# Patient Record
Sex: MALE | Race: WHITE
[De-identification: names, ages, dates, MRNs, and addresses within clinical notes are randomized per-mention and may not be internally consistent; named-entity substitution may affect disease eponyms.]

---

## 2018-01-10 ENCOUNTER — HOSPITAL ENCOUNTER (OUTPATIENT)
Dept: HOSPITAL 95 - PLD | Age: 78
Discharge: HOME | End: 2018-01-10
Attending: DERMATOLOGY
Payer: MEDICARE

## 2018-01-10 DIAGNOSIS — D22.5: Primary | ICD-10-CM

## 2021-01-12 ENCOUNTER — HOSPITAL ENCOUNTER (OUTPATIENT)
Dept: HOSPITAL 95 - PLD | Age: 81
Discharge: HOME | End: 2021-01-12
Attending: DERMATOLOGY
Payer: MEDICARE

## 2021-01-12 DIAGNOSIS — D48.5: Primary | ICD-10-CM

## 2021-03-31 LAB
ALBUMIN SERPL BCP-MCNC: 3.6 G/DL (ref 3.4–5)
ALBUMIN/GLOB SERPL: 1 {RATIO} (ref 0.8–1.8)
ALT SERPL W P-5'-P-CCNC: 34 U/L (ref 12–78)
ANION GAP SERPL CALCULATED.4IONS-SCNC: 8 MMOL/L (ref 6–16)
AST SERPL W P-5'-P-CCNC: 40 U/L (ref 12–37)
BASOPHILS # BLD: 0 K/MM3 (ref 0–0.23)
BASOPHILS NFR BLD: 0 % (ref 0–2)
BILIRUB SERPL-MCNC: 0.4 MG/DL (ref 0.1–1)
BLASTS NFR BLD MANUAL: 2 % (ref 0–0)
BUN SERPL-MCNC: 31 MG/DL (ref 8–24)
CALCIUM SERPL-MCNC: 8.3 MG/DL (ref 8.5–10.1)
CHLORIDE SERPL-SCNC: 106 MMOL/L (ref 98–108)
CO2 SERPL-SCNC: 26 MMOL/L (ref 21–32)
CREAT SERPL-MCNC: 1.06 MG/DL (ref 0.6–1.2)
DEPRECATED RDW RBC AUTO: 97.5 FL (ref 35.1–46.3)
EOSINOPHIL # BLD: 0 K/MM3 (ref 0–0.68)
EOSINOPHIL NFR BLD: 0 % (ref 0–6)
ERYTHROCYTE [DISTWIDTH] IN BLOOD BY AUTOMATED COUNT: 22.5 % (ref 11.7–14.2)
GLOBULIN SER CALC-MCNC: 3.6 G/DL (ref 2.2–4)
GLUCOSE SERPL-MCNC: 135 MG/DL (ref 70–99)
HCT VFR BLD AUTO: 21.7 % (ref 37–53)
HGB BLD-MCNC: 6.8 G/DL (ref 13.5–17.5)
LYMPHOCYTES # BLD: 3.88 K/MM3 (ref 0.84–5.2)
LYMPHOCYTES NFR BLD: 12 % (ref 21–46)
MCHC RBC AUTO-ENTMCNC: 31.3 G/DL (ref 31.5–36.5)
MCV RBC AUTO: 123 FL (ref 80–100)
METAMYELOCYTES # BLD MANUAL: 0.64 K/MM3 (ref 0–0)
METAMYELOCYTES NFR BLD MANUAL: 2 % (ref 0–0)
MONOCYTES # BLD: 5.17 K/MM3 (ref 0.16–1.47)
MONOCYTES NFR BLD: 16 % (ref 4–13)
MYELOCYTES # BLD MANUAL: 3.88 K/MM3 (ref 0–0)
MYELOCYTES NFR BLD MANUAL: 12 % (ref 0–0)
NEUTS BAND NFR BLD MANUAL: 1 % (ref 0–8)
NEUTS SEG # BLD MANUAL: 18.11 K/MM3 (ref 1.96–9.15)
NEUTS SEG NFR BLD MANUAL: 55 % (ref 41–73)
NRBC # BLD AUTO: 0.29 K/MM3 (ref 0–0.02)
NRBC BLD AUTO-RTO: 0.9 /100 WBC (ref 0–0.2)
PLATELET # BLD AUTO: 181 K/MM3 (ref 150–400)
POTASSIUM SERPL-SCNC: 3.4 MMOL/L (ref 3.5–5.5)
PROT SERPL-MCNC: 7.2 G/DL (ref 6.4–8.2)
SODIUM SERPL-SCNC: 140 MMOL/L (ref 136–145)
TOTAL CELLS COUNTED BLD: 100

## 2021-04-01 ENCOUNTER — HOSPITAL ENCOUNTER (OUTPATIENT)
Dept: HOSPITAL 95 - ATC | Age: 81
Discharge: HOME | End: 2021-04-01
Attending: INTERNAL MEDICINE
Payer: MEDICARE

## 2021-04-01 DIAGNOSIS — D64.9: ICD-10-CM

## 2021-04-01 DIAGNOSIS — D47.3: ICD-10-CM

## 2021-04-01 DIAGNOSIS — C92.A0: Primary | ICD-10-CM

## 2021-04-01 PROCEDURE — P9016 RBC LEUKOCYTES REDUCED: HCPCS

## 2021-04-21 ENCOUNTER — HOSPITAL ENCOUNTER (OUTPATIENT)
Dept: HOSPITAL 95 - ATC | Age: 81
Discharge: HOME | End: 2021-04-21
Attending: INTERNAL MEDICINE
Payer: MEDICARE

## 2021-04-21 DIAGNOSIS — D47.3: ICD-10-CM

## 2021-04-21 DIAGNOSIS — C92.A0: Primary | ICD-10-CM

## 2021-04-21 DIAGNOSIS — D64.9: ICD-10-CM

## 2021-04-21 DIAGNOSIS — I10: ICD-10-CM

## 2021-04-21 DIAGNOSIS — C64.2: ICD-10-CM

## 2021-04-21 DIAGNOSIS — Z87.891: ICD-10-CM

## 2021-04-21 DIAGNOSIS — C78.80: ICD-10-CM

## 2021-04-21 LAB
ALBUMIN SERPL BCP-MCNC: 3.6 G/DL (ref 3.4–5)
ALBUMIN/GLOB SERPL: 1.1 {RATIO} (ref 0.8–1.8)
ALT SERPL W P-5'-P-CCNC: 25 U/L (ref 12–78)
ANION GAP SERPL CALCULATED.4IONS-SCNC: 5 MMOL/L (ref 6–16)
AST SERPL W P-5'-P-CCNC: 9 U/L (ref 12–37)
BASOPHILS # BLD: 0 K/MM3 (ref 0–0.23)
BASOPHILS NFR BLD: 0 % (ref 0–2)
BILIRUB SERPL-MCNC: 0.7 MG/DL (ref 0.1–1)
BUN SERPL-MCNC: 19 MG/DL (ref 8–24)
CALCIUM SERPL-MCNC: 8.3 MG/DL (ref 8.5–10.1)
CHLORIDE SERPL-SCNC: 106 MMOL/L (ref 98–108)
CO2 SERPL-SCNC: 28 MMOL/L (ref 21–32)
CREAT SERPL-MCNC: 0.95 MG/DL (ref 0.6–1.2)
DEPRECATED RDW RBC AUTO: 78.8 FL (ref 35.1–46.3)
EOSINOPHIL # BLD: 0 K/MM3 (ref 0–0.68)
EOSINOPHIL NFR BLD: 0 % (ref 0–6)
ERYTHROCYTE [DISTWIDTH] IN BLOOD BY AUTOMATED COUNT: 21.4 % (ref 11.7–14.2)
GLOBULIN SER CALC-MCNC: 3.3 G/DL (ref 2.2–4)
GLUCOSE SERPL-MCNC: 104 MG/DL (ref 70–99)
HCT VFR BLD AUTO: 18.8 % (ref 37–53)
HGB BLD-MCNC: 6 G/DL (ref 13.5–17.5)
LYMPHOCYTES # BLD: 0.62 K/MM3 (ref 0.84–5.2)
LYMPHOCYTES NFR BLD: 32 % (ref 21–46)
MCHC RBC AUTO-ENTMCNC: 31.9 G/DL (ref 31.5–36.5)
MCV RBC AUTO: 107 FL (ref 80–100)
MONOCYTES # BLD: 0.99 K/MM3 (ref 0.16–1.47)
MONOCYTES NFR BLD: 51 % (ref 4–13)
NEUTS SEG # BLD MANUAL: 0.33 K/MM3 (ref 1.96–9.15)
NEUTS SEG NFR BLD MANUAL: 17 % (ref 41–73)
NRBC # BLD AUTO: 0.02 K/MM3 (ref 0–0.02)
NRBC BLD AUTO-RTO: 1 /100 WBC (ref 0–0.2)
PLATELET # BLD AUTO: 169 K/MM3 (ref 150–400)
POTASSIUM SERPL-SCNC: 3.7 MMOL/L (ref 3.5–5.5)
PROT SERPL-MCNC: 6.9 G/DL (ref 6.4–8.2)
SODIUM SERPL-SCNC: 139 MMOL/L (ref 136–145)
TOTAL CELLS COUNTED BLD: 100

## 2021-04-21 PROCEDURE — P9016 RBC LEUKOCYTES REDUCED: HCPCS

## 2021-05-10 LAB
ALBUMIN SERPL BCP-MCNC: 3.2 G/DL (ref 3.4–5)
ALBUMIN/GLOB SERPL: 1.1 {RATIO} (ref 0.8–1.8)
ALT SERPL W P-5'-P-CCNC: 22 U/L (ref 12–78)
ANION GAP SERPL CALCULATED.4IONS-SCNC: 7 MMOL/L (ref 6–16)
AST SERPL W P-5'-P-CCNC: 9 U/L (ref 12–37)
BASOPHILS # BLD AUTO: 0 K/MM3 (ref 0–0.23)
BASOPHILS # BLD: 0 K/MM3 (ref 0–0.23)
BASOPHILS NFR BLD AUTO: 0 % (ref 0–2)
BASOPHILS NFR BLD: 0 % (ref 0–2)
BILIRUB SERPL-MCNC: 0.4 MG/DL (ref 0.1–1)
BUN SERPL-MCNC: 16 MG/DL (ref 8–24)
CALCIUM SERPL-MCNC: 8.3 MG/DL (ref 8.5–10.1)
CHLORIDE SERPL-SCNC: 101 MMOL/L (ref 98–108)
CO2 SERPL-SCNC: 26 MMOL/L (ref 21–32)
CREAT SERPL-MCNC: 0.87 MG/DL (ref 0.6–1.2)
DEPRECATED RDW RBC AUTO: 72 FL (ref 35.1–46.3)
EOSINOPHIL # BLD AUTO: 0 K/MM3 (ref 0–0.68)
EOSINOPHIL # BLD: 0 K/MM3 (ref 0–0.68)
EOSINOPHIL NFR BLD AUTO: 0 % (ref 0–6)
EOSINOPHIL NFR BLD: 0 % (ref 0–6)
ERYTHROCYTE [DISTWIDTH] IN BLOOD BY AUTOMATED COUNT: 21.2 % (ref 11.7–14.2)
GLOBULIN SER CALC-MCNC: 2.8 G/DL (ref 2.2–4)
GLUCOSE SERPL-MCNC: 112 MG/DL (ref 70–99)
HCT VFR BLD AUTO: 19.1 % (ref 37–53)
HGB BLD-MCNC: 6 G/DL (ref 13.5–17.5)
IMM GRANULOCYTES # BLD AUTO: 0 K/MM3 (ref 0–0.1)
IMM GRANULOCYTES NFR BLD AUTO: 0 % (ref 0–1)
LYMPHOCYTES # BLD AUTO: 0.32 K/MM3 (ref 0.84–5.2)
LYMPHOCYTES # BLD: 0.49 K/MM3 (ref 0.84–5.2)
LYMPHOCYTES NFR BLD AUTO: 18 % (ref 21–46)
LYMPHOCYTES NFR BLD: 28 % (ref 21–46)
MCHC RBC AUTO-ENTMCNC: 31.4 G/DL (ref 31.5–36.5)
MCV RBC AUTO: 101 FL (ref 80–100)
MONOCYTES # BLD AUTO: 1.16 K/MM3 (ref 0.16–1.47)
MONOCYTES # BLD: 0.89 K/MM3 (ref 0.16–1.47)
MONOCYTES NFR BLD AUTO: 66 % (ref 4–13)
MONOCYTES NFR BLD: 51 % (ref 4–13)
NEUTROPHILS # BLD AUTO: 0.27 K/MM3 (ref 1.96–9.15)
NEUTROPHILS NFR BLD AUTO: 15 % (ref 41–73)
NEUTS SEG # BLD MANUAL: 0.36 K/MM3 (ref 1.96–9.15)
NEUTS SEG NFR BLD MANUAL: 21 % (ref 41–73)
NRBC # BLD AUTO: 0 K/MM3 (ref 0–0.02)
NRBC BLD AUTO-RTO: 0 /100 WBC (ref 0–0.2)
PLATELET # BLD AUTO: 245 K/MM3 (ref 150–400)
POTASSIUM SERPL-SCNC: 3.6 MMOL/L (ref 3.5–5.5)
PROT SERPL-MCNC: 6 G/DL (ref 6.4–8.2)
SODIUM SERPL-SCNC: 134 MMOL/L (ref 136–145)
TOTAL CELLS COUNTED BLD: 100

## 2021-05-13 ENCOUNTER — HOSPITAL ENCOUNTER (OUTPATIENT)
Dept: HOSPITAL 95 - ATC | Age: 81
Discharge: HOME | End: 2021-05-13
Attending: INTERNAL MEDICINE
Payer: MEDICARE

## 2021-05-13 DIAGNOSIS — C92.A0: Primary | ICD-10-CM

## 2021-05-13 DIAGNOSIS — D64.9: ICD-10-CM

## 2021-05-13 PROCEDURE — P9016 RBC LEUKOCYTES REDUCED: HCPCS

## 2021-07-13 ENCOUNTER — HOSPITAL ENCOUNTER (OUTPATIENT)
Dept: HOSPITAL 95 - LAB | Age: 81
Discharge: HOME | End: 2021-07-13
Attending: DERMATOLOGY
Payer: MEDICARE

## 2021-07-13 DIAGNOSIS — D48.5: Primary | ICD-10-CM

## 2021-07-15 ENCOUNTER — HOSPITAL ENCOUNTER (OUTPATIENT)
Dept: HOSPITAL 95 - ORSCMMR | Age: 81
Discharge: HOME | End: 2021-07-15
Attending: SURGERY
Payer: MEDICARE

## 2021-07-15 VITALS — HEIGHT: 68 IN | BODY MASS INDEX: 27.6 KG/M2 | WEIGHT: 182.1 LBS

## 2021-07-15 DIAGNOSIS — D47.3: ICD-10-CM

## 2021-07-15 DIAGNOSIS — D46.9: Primary | ICD-10-CM

## 2021-07-15 DIAGNOSIS — D50.9: ICD-10-CM

## 2021-07-15 DIAGNOSIS — Z79.899: ICD-10-CM

## 2021-07-15 DIAGNOSIS — Z79.82: ICD-10-CM

## 2021-07-15 PROCEDURE — A9270 NON-COVERED ITEM OR SERVICE: HCPCS

## 2021-07-15 PROCEDURE — 05HM33Z INSERTION OF INFUSION DEVICE INTO RIGHT INTERNAL JUGULAR VEIN, PERCUTANEOUS APPROACH: ICD-10-PCS | Performed by: SURGERY

## 2021-07-15 PROCEDURE — C1788 PORT, INDWELLING, IMP: HCPCS

## 2021-07-15 PROCEDURE — B5131ZA FLUOROSCOPY OF RIGHT JUGULAR VEINS USING LOW OSMOLAR CONTRAST, GUIDANCE: ICD-10-PCS | Performed by: SURGERY

## 2021-07-15 NOTE — NUR
Ambulatory in Day Surgery
History, Chart, Medications and Allergies reviewed before start of
procedure.Patient confirms NPO status and agrees with scheduled surgery.
Patient reports completing Chlorhexadine shower X2 prior to admission to
hospital.Surgical site prepped with 2% Chlorhexidine cloth wipe.
Lungs clear T/O to Auscultation.
Patient States Post-Procedure ride home has been arranged.

## 2021-07-19 ENCOUNTER — HOSPITAL ENCOUNTER (OUTPATIENT)
Dept: HOSPITAL 95 - LAB SHORT | Age: 81
Discharge: HOME | End: 2021-07-19
Attending: INTERNAL MEDICINE
Payer: MEDICARE

## 2021-07-19 DIAGNOSIS — D46.9: Primary | ICD-10-CM

## 2021-07-19 LAB
ALBUMIN SERPL BCP-MCNC: 3.4 G/DL (ref 3.4–5)
ALBUMIN/GLOB SERPL: 1.3 {RATIO} (ref 0.8–1.8)
ALT SERPL W P-5'-P-CCNC: 24 U/L (ref 12–78)
ANION GAP SERPL CALCULATED.4IONS-SCNC: 4 MMOL/L (ref 6–16)
AST SERPL W P-5'-P-CCNC: 13 U/L (ref 12–37)
BASOPHILS # BLD AUTO: 0 K/MM3 (ref 0–0.23)
BASOPHILS NFR BLD AUTO: 0 % (ref 0–2)
BILIRUB SERPL-MCNC: 0.5 MG/DL (ref 0.1–1)
BUN SERPL-MCNC: 15 MG/DL (ref 8–24)
CALCIUM SERPL-MCNC: 8.4 MG/DL (ref 8.5–10.1)
CHLORIDE SERPL-SCNC: 108 MMOL/L (ref 98–108)
CO2 SERPL-SCNC: 27 MMOL/L (ref 21–32)
CREAT SERPL-MCNC: 0.77 MG/DL (ref 0.6–1.2)
DEPRECATED RDW RBC AUTO: 87.7 FL (ref 35.1–46.3)
EOSINOPHIL # BLD AUTO: 0 K/MM3 (ref 0–0.68)
EOSINOPHIL NFR BLD AUTO: 0 % (ref 0–6)
ERYTHROCYTE [DISTWIDTH] IN BLOOD BY AUTOMATED COUNT: 23.6 % (ref 11.7–14.2)
GLOBULIN SER CALC-MCNC: 2.7 G/DL (ref 2.2–4)
GLUCOSE SERPL-MCNC: 108 MG/DL (ref 70–99)
HCT VFR BLD AUTO: 30.2 % (ref 37–53)
HGB BLD-MCNC: 9.7 G/DL (ref 13.5–17.5)
IMM GRANULOCYTES # BLD AUTO: 0 K/MM3 (ref 0–0.1)
IMM GRANULOCYTES NFR BLD AUTO: 0 % (ref 0–1)
LYMPHOCYTES # BLD AUTO: 0.24 K/MM3 (ref 0.84–5.2)
LYMPHOCYTES NFR BLD AUTO: 29 % (ref 21–46)
MCHC RBC AUTO-ENTMCNC: 32.1 G/DL (ref 31.5–36.5)
MCV RBC AUTO: 103 FL (ref 80–100)
MONOCYTES # BLD AUTO: 0.18 K/MM3 (ref 0.16–1.47)
MONOCYTES NFR BLD AUTO: 22 % (ref 4–13)
NEUTROPHILS # BLD AUTO: 0.4 K/MM3 (ref 1.96–9.15)
NEUTROPHILS NFR BLD AUTO: 49 % (ref 41–73)
NRBC # BLD AUTO: 0 K/MM3 (ref 0–0.02)
NRBC BLD AUTO-RTO: 0 /100 WBC (ref 0–0.2)
PLATELET # BLD AUTO: 285 K/MM3 (ref 150–400)
POTASSIUM SERPL-SCNC: 4 MMOL/L (ref 3.5–5.5)
PROT SERPL-MCNC: 6.1 G/DL (ref 6.4–8.2)
SODIUM SERPL-SCNC: 139 MMOL/L (ref 136–145)

## 2021-07-26 ENCOUNTER — HOSPITAL ENCOUNTER (OUTPATIENT)
Dept: HOSPITAL 95 - LAB SHORT | Age: 81
Discharge: HOME | End: 2021-07-26
Attending: INTERNAL MEDICINE
Payer: MEDICARE

## 2021-07-26 DIAGNOSIS — D46.9: Primary | ICD-10-CM

## 2021-07-26 LAB
ALBUMIN SERPL BCP-MCNC: 3.5 G/DL (ref 3.4–5)
ALBUMIN/GLOB SERPL: 1.2 {RATIO} (ref 0.8–1.8)
ALT SERPL W P-5'-P-CCNC: 24 U/L (ref 12–78)
ANION GAP SERPL CALCULATED.4IONS-SCNC: 5 MMOL/L (ref 6–16)
AST SERPL W P-5'-P-CCNC: 10 U/L (ref 12–37)
BASOPHILS # BLD AUTO: 0 K/MM3 (ref 0–0.23)
BASOPHILS NFR BLD AUTO: 0 % (ref 0–2)
BILIRUB SERPL-MCNC: 0.5 MG/DL (ref 0.1–1)
BUN SERPL-MCNC: 16 MG/DL (ref 8–24)
CALCIUM SERPL-MCNC: 8.5 MG/DL (ref 8.5–10.1)
CHLORIDE SERPL-SCNC: 104 MMOL/L (ref 98–108)
CO2 SERPL-SCNC: 28 MMOL/L (ref 21–32)
CREAT SERPL-MCNC: 0.78 MG/DL (ref 0.6–1.2)
DEPRECATED RDW RBC AUTO: 86.3 FL (ref 35.1–46.3)
EOSINOPHIL # BLD AUTO: 0 K/MM3 (ref 0–0.68)
EOSINOPHIL NFR BLD AUTO: 0 % (ref 0–6)
ERYTHROCYTE [DISTWIDTH] IN BLOOD BY AUTOMATED COUNT: 23.9 % (ref 11.7–14.2)
GLOBULIN SER CALC-MCNC: 2.8 G/DL (ref 2.2–4)
GLUCOSE SERPL-MCNC: 120 MG/DL (ref 70–99)
HCT VFR BLD AUTO: 30.4 % (ref 37–53)
HGB BLD-MCNC: 9.9 G/DL (ref 13.5–17.5)
IMM GRANULOCYTES # BLD AUTO: 0.02 K/MM3 (ref 0–0.1)
IMM GRANULOCYTES NFR BLD AUTO: 1 % (ref 0–1)
LYMPHOCYTES # BLD AUTO: 0.45 K/MM3 (ref 0.84–5.2)
LYMPHOCYTES NFR BLD AUTO: 14 % (ref 21–46)
MCHC RBC AUTO-ENTMCNC: 32.6 G/DL (ref 31.5–36.5)
MCV RBC AUTO: 102 FL (ref 80–100)
MONOCYTES # BLD AUTO: 0.21 K/MM3 (ref 0.16–1.47)
MONOCYTES NFR BLD AUTO: 7 % (ref 4–13)
NEUTROPHILS # BLD AUTO: 2.49 K/MM3 (ref 1.96–9.15)
NEUTROPHILS NFR BLD AUTO: 79 % (ref 41–73)
NRBC # BLD AUTO: 0 K/MM3 (ref 0–0.02)
NRBC BLD AUTO-RTO: 0 /100 WBC (ref 0–0.2)
PLATELET # BLD AUTO: 390 K/MM3 (ref 150–400)
POTASSIUM SERPL-SCNC: 4 MMOL/L (ref 3.5–5.5)
PROT SERPL-MCNC: 6.3 G/DL (ref 6.4–8.2)
SODIUM SERPL-SCNC: 137 MMOL/L (ref 136–145)

## 2021-08-18 ENCOUNTER — HOSPITAL ENCOUNTER (OUTPATIENT)
Dept: HOSPITAL 95 - LAB SHORT | Age: 81
Discharge: HOME | End: 2021-08-18
Attending: INTERNAL MEDICINE
Payer: MEDICARE

## 2021-08-18 DIAGNOSIS — D46.9: Primary | ICD-10-CM

## 2021-08-18 LAB
BASOPHILS # BLD AUTO: 0.01 K/MM3 (ref 0–0.23)
BASOPHILS # BLD: 0 K/MM3 (ref 0–0.23)
BASOPHILS NFR BLD AUTO: 1 % (ref 0–2)
BASOPHILS NFR BLD: 0 % (ref 0–2)
DEPRECATED RDW RBC AUTO: 89 FL (ref 35.1–46.3)
EOSINOPHIL # BLD AUTO: 0 K/MM3 (ref 0–0.68)
EOSINOPHIL # BLD: 0 K/MM3 (ref 0–0.68)
EOSINOPHIL NFR BLD AUTO: 0 % (ref 0–6)
EOSINOPHIL NFR BLD: 0 % (ref 0–6)
ERYTHROCYTE [DISTWIDTH] IN BLOOD BY AUTOMATED COUNT: 22.5 % (ref 11.7–14.2)
HCT VFR BLD AUTO: 36.3 % (ref 37–53)
HGB BLD-MCNC: 11.7 G/DL (ref 13.5–17.5)
IMM GRANULOCYTES # BLD AUTO: 0.02 K/MM3 (ref 0–0.1)
IMM GRANULOCYTES NFR BLD AUTO: 1 % (ref 0–1)
LYMPHOCYTES # BLD AUTO: 0.51 K/MM3 (ref 0.84–5.2)
LYMPHOCYTES # BLD: 0.48 K/MM3 (ref 0.84–5.2)
LYMPHOCYTES NFR BLD AUTO: 27 % (ref 21–46)
LYMPHOCYTES NFR BLD: 26 % (ref 21–46)
MCHC RBC AUTO-ENTMCNC: 32.2 G/DL (ref 31.5–36.5)
MCV RBC AUTO: 107 FL (ref 80–100)
MONOCYTES # BLD AUTO: 0.43 K/MM3 (ref 0.16–1.47)
MONOCYTES # BLD: 0.33 K/MM3 (ref 0.16–1.47)
MONOCYTES NFR BLD AUTO: 23 % (ref 4–13)
MONOCYTES NFR BLD: 18 % (ref 4–13)
NEUTROPHILS # BLD AUTO: 0.91 K/MM3 (ref 1.96–9.15)
NEUTROPHILS NFR BLD AUTO: 48 % (ref 41–73)
NEUTS SEG # BLD MANUAL: 1.05 K/MM3 (ref 1.96–9.15)
NEUTS SEG NFR BLD MANUAL: 56 % (ref 41–73)
NRBC # BLD AUTO: 0.04 K/MM3 (ref 0–0.02)
NRBC BLD AUTO-RTO: 2.1 /100 WBC (ref 0–0.2)
PLATELET # BLD AUTO: 1994 K/MM3 (ref 150–400)
TOTAL CELLS COUNTED BLD: 50

## 2021-08-25 ENCOUNTER — HOSPITAL ENCOUNTER (OUTPATIENT)
Dept: HOSPITAL 95 - LAB SHORT | Age: 81
Discharge: HOME | End: 2021-08-25
Attending: INTERNAL MEDICINE
Payer: MEDICARE

## 2021-08-25 DIAGNOSIS — D46.9: Primary | ICD-10-CM

## 2021-08-25 LAB
BASOPHILS # BLD AUTO: 0.01 K/MM3 (ref 0–0.23)
BASOPHILS # BLD: 0 K/MM3 (ref 0–0.23)
BASOPHILS NFR BLD AUTO: 0 % (ref 0–2)
BASOPHILS NFR BLD: 0 % (ref 0–2)
DEPRECATED RDW RBC AUTO: 83.8 FL (ref 35.1–46.3)
EOSINOPHIL # BLD AUTO: 0 K/MM3 (ref 0–0.68)
EOSINOPHIL # BLD: 0 K/MM3 (ref 0–0.68)
EOSINOPHIL NFR BLD AUTO: 0 % (ref 0–6)
EOSINOPHIL NFR BLD: 0 % (ref 0–6)
ERYTHROCYTE [DISTWIDTH] IN BLOOD BY AUTOMATED COUNT: 22.2 % (ref 11.7–14.2)
HCT VFR BLD AUTO: 36.3 % (ref 37–53)
HGB BLD-MCNC: 11.7 G/DL (ref 13.5–17.5)
IMM GRANULOCYTES # BLD AUTO: 0.04 K/MM3 (ref 0–0.1)
IMM GRANULOCYTES NFR BLD AUTO: 1 % (ref 0–1)
LYMPHOCYTES # BLD AUTO: 0.68 K/MM3 (ref 0.84–5.2)
LYMPHOCYTES # BLD: 0.55 K/MM3 (ref 0.84–5.2)
LYMPHOCYTES NFR BLD AUTO: 13 % (ref 21–46)
LYMPHOCYTES NFR BLD: 11 % (ref 21–46)
MCHC RBC AUTO-ENTMCNC: 32.2 G/DL (ref 31.5–36.5)
MCV RBC AUTO: 106 FL (ref 80–100)
MONOCYTES # BLD AUTO: 1 K/MM3 (ref 0.16–1.47)
MONOCYTES # BLD: 0.91 K/MM3 (ref 0.16–1.47)
MONOCYTES NFR BLD AUTO: 20 % (ref 4–13)
MONOCYTES NFR BLD: 18 % (ref 4–13)
NEUTROPHILS # BLD AUTO: 3.35 K/MM3 (ref 1.96–9.15)
NEUTROPHILS NFR BLD AUTO: 66 % (ref 41–73)
NEUTS SEG # BLD MANUAL: 3.6 K/MM3 (ref 1.96–9.15)
NEUTS SEG NFR BLD MANUAL: 71 % (ref 41–73)
NRBC # BLD AUTO: 0.02 K/MM3 (ref 0–0.02)
NRBC BLD AUTO-RTO: 0.4 /100 WBC (ref 0–0.2)
PLATELET # BLD AUTO: 1320 K/MM3 (ref 150–400)
TOTAL CELLS COUNTED BLD: 100

## 2022-04-05 ENCOUNTER — HOSPITAL ENCOUNTER (OUTPATIENT)
Dept: HOSPITAL 95 - RAD | Age: 82
Discharge: HOME | End: 2022-04-05
Attending: INTERNAL MEDICINE
Payer: MEDICARE

## 2022-04-05 DIAGNOSIS — D46.9: Primary | ICD-10-CM

## 2022-12-17 ENCOUNTER — HOSPITAL ENCOUNTER (OUTPATIENT)
Dept: HOSPITAL 95 - LAB SHORT | Age: 82
End: 2022-12-17
Attending: PHYSICIAN ASSISTANT
Payer: MEDICARE

## 2022-12-17 DIAGNOSIS — Z86.73: ICD-10-CM

## 2022-12-17 DIAGNOSIS — H54.7: Primary | ICD-10-CM

## 2022-12-17 LAB
BASOPHILS # BLD: 0 K/MM3 (ref 0–0.23)
BASOPHILS NFR BLD: 0 % (ref 0–2)
DEPRECATED RDW RBC AUTO: 88.3 FL (ref 35.1–46.3)
EOSINOPHIL # BLD: 0 K/MM3 (ref 0–0.68)
EOSINOPHIL NFR BLD: 0 % (ref 0–6)
ERYTHROCYTE [DISTWIDTH] IN BLOOD BY AUTOMATED COUNT: 25 % (ref 11.7–14.2)
HCT VFR BLD AUTO: 36 % (ref 37–53)
HGB BLD-MCNC: 11.6 G/DL (ref 13.5–17.5)
LYMPHOCYTES # BLD: 1.43 K/MM3 (ref 0.84–5.2)
LYMPHOCYTES NFR BLD: 22 % (ref 21–46)
MCHC RBC AUTO-ENTMCNC: 32.2 G/DL (ref 31.5–36.5)
MCV RBC AUTO: 99 FL (ref 80–100)
METAMYELOCYTES # BLD MANUAL: 0.06 K/MM3 (ref 0–0)
METAMYELOCYTES NFR BLD MANUAL: 1 % (ref 0–0)
MONOCYTES # BLD: 0.39 K/MM3 (ref 0.16–1.47)
MONOCYTES NFR BLD: 6 % (ref 4–13)
NEUTS BAND NFR BLD MANUAL: 6 % (ref 0–8)
NEUTS SEG # BLD MANUAL: 4.64 K/MM3 (ref 1.96–9.15)
NEUTS SEG NFR BLD MANUAL: 65 % (ref 41–73)
NRBC # BLD AUTO: 0 K/MM3 (ref 0–0.02)
NRBC BLD AUTO-RTO: 0 /100 WBC (ref 0–0.2)
PLATELET # BLD AUTO: 539 K/MM3 (ref 150–400)
TOTAL CELLS COUNTED BLD: 100

## 2023-05-22 ENCOUNTER — HOSPITAL ENCOUNTER (INPATIENT)
Dept: HOSPITAL 95 - ER | Age: 83
LOS: 3 days | Discharge: HOSPICE | DRG: 834 | End: 2023-05-25
Attending: INTERNAL MEDICINE | Admitting: HOSPITALIST
Payer: MEDICARE

## 2023-05-22 VITALS — WEIGHT: 179.46 LBS | BODY MASS INDEX: 27.2 KG/M2 | HEIGHT: 68 IN

## 2023-05-22 DIAGNOSIS — Z79.02: ICD-10-CM

## 2023-05-22 DIAGNOSIS — D46.9: ICD-10-CM

## 2023-05-22 DIAGNOSIS — K37: ICD-10-CM

## 2023-05-22 DIAGNOSIS — A41.9: ICD-10-CM

## 2023-05-22 DIAGNOSIS — M19.90: ICD-10-CM

## 2023-05-22 DIAGNOSIS — Z79.82: ICD-10-CM

## 2023-05-22 DIAGNOSIS — C92.00: Primary | ICD-10-CM

## 2023-05-22 DIAGNOSIS — K80.20: ICD-10-CM

## 2023-05-22 DIAGNOSIS — G45.3: ICD-10-CM

## 2023-05-22 DIAGNOSIS — Z51.5: ICD-10-CM

## 2023-05-22 DIAGNOSIS — B02.9: ICD-10-CM

## 2023-05-22 DIAGNOSIS — Z98.890: ICD-10-CM

## 2023-05-22 DIAGNOSIS — I10: ICD-10-CM

## 2023-05-22 DIAGNOSIS — Z79.899: ICD-10-CM

## 2023-05-22 LAB
ALBUMIN SERPL BCP-MCNC: 4 G/DL (ref 3.4–5)
ALBUMIN/GLOB SERPL: 1.2 {RATIO} (ref 0.8–1.8)
ALT SERPL W P-5'-P-CCNC: 27 U/L (ref 12–78)
ANION GAP SERPL CALCULATED.4IONS-SCNC: 4 MMOL/L (ref 6–16)
AST SERPL W P-5'-P-CCNC: 28 U/L (ref 12–37)
BASOPHILS # BLD: 0 K/MM3 (ref 0–0.23)
BASOPHILS NFR BLD: 0 % (ref 0–2)
BILIRUB SERPL-MCNC: 0.3 MG/DL (ref 0.1–1)
BUN SERPL-MCNC: 19 MG/DL (ref 8–24)
CALCIUM SERPL-MCNC: 9 MG/DL (ref 8.5–10.1)
CHLORIDE SERPL-SCNC: 104 MMOL/L (ref 98–108)
CO2 SERPL-SCNC: 26 MMOL/L (ref 21–32)
CREAT SERPL-MCNC: 0.92 MG/DL (ref 0.6–1.2)
DEPRECATED RDW RBC AUTO: 95.6 FL (ref 35.1–46.3)
EOSINOPHIL # BLD: 0.21 K/MM3 (ref 0–0.68)
EOSINOPHIL NFR BLD: 1 % (ref 0–6)
ERYTHROCYTE [DISTWIDTH] IN BLOOD BY AUTOMATED COUNT: 27.7 % (ref 11.7–14.2)
GLOBULIN SER CALC-MCNC: 3.3 G/DL (ref 2.2–4)
GLUCOSE SERPL-MCNC: 114 MG/DL (ref 70–99)
HCT VFR BLD AUTO: 33.2 % (ref 37–53)
HGB BLD-MCNC: 10.4 G/DL (ref 13.5–17.5)
LYMPHOCYTES # BLD: 2.52 K/MM3 (ref 0.84–5.2)
LYMPHOCYTES NFR BLD: 12 % (ref 21–46)
MCHC RBC AUTO-ENTMCNC: 31.3 G/DL (ref 31.5–36.5)
MCV RBC AUTO: 97 FL (ref 80–100)
METAMYELOCYTES # BLD MANUAL: 0.84 K/MM3 (ref 0–0)
METAMYELOCYTES NFR BLD MANUAL: 4 % (ref 0–0)
MONOCYTES # BLD: 0.42 K/MM3 (ref 0.16–1.47)
MONOCYTES NFR BLD: 2 % (ref 4–13)
MYELOCYTES # BLD MANUAL: 0.84 K/MM3 (ref 0–0)
MYELOCYTES NFR BLD MANUAL: 4 % (ref 0–0)
NEUTS BAND NFR BLD MANUAL: 5 % (ref 0–8)
NEUTS SEG # BLD MANUAL: 11.37 K/MM3 (ref 1.96–9.15)
NEUTS SEG NFR BLD MANUAL: 49 % (ref 41–73)
NRBC # BLD AUTO: 0.75 K/MM3 (ref 0–0.02)
NRBC BLD AUTO-RTO: 3.6 /100 WBC (ref 0–0.2)
PLATELET # BLD AUTO: 287 K/MM3 (ref 150–400)
POTASSIUM SERPL-SCNC: 4 MMOL/L (ref 3.5–5.5)
PROT SERPL-MCNC: 7.3 G/DL (ref 6.4–8.2)
PROT UR STRIP-MCNC: (no result) MG/DL
RBC #/AREA URNS HPF: (no result) /HPF (ref 0–2)
SODIUM SERPL-SCNC: 134 MMOL/L (ref 136–145)
SP GR SPEC: 1.01 (ref 1–1.02)
TOTAL CELLS COUNTED BLD: 100
UROBILINOGEN UR STRIP-MCNC: (no result) MG/DL
WBC #/AREA URNS HPF: (no result) /HPF (ref 0–5)

## 2023-05-22 PROCEDURE — A9270 NON-COVERED ITEM OR SERVICE: HCPCS

## 2023-05-23 VITALS — SYSTOLIC BLOOD PRESSURE: 141 MMHG | DIASTOLIC BLOOD PRESSURE: 66 MMHG

## 2023-05-23 VITALS — DIASTOLIC BLOOD PRESSURE: 59 MMHG | SYSTOLIC BLOOD PRESSURE: 104 MMHG

## 2023-05-23 VITALS — DIASTOLIC BLOOD PRESSURE: 57 MMHG | SYSTOLIC BLOOD PRESSURE: 94 MMHG

## 2023-05-23 VITALS — DIASTOLIC BLOOD PRESSURE: 62 MMHG | SYSTOLIC BLOOD PRESSURE: 114 MMHG

## 2023-05-23 LAB
ALBUMIN SERPL BCP-MCNC: 3.5 G/DL (ref 3.4–5)
ALBUMIN/GLOB SERPL: 1.2 {RATIO} (ref 0.8–1.8)
ALT SERPL W P-5'-P-CCNC: 27 U/L (ref 12–78)
ANION GAP SERPL CALCULATED.4IONS-SCNC: 4 MMOL/L (ref 6–16)
AST SERPL W P-5'-P-CCNC: 90 U/L (ref 12–37)
BASOPHILS # BLD: 0 K/MM3 (ref 0–0.23)
BASOPHILS NFR BLD: 0 % (ref 0–2)
BILIRUB SERPL-MCNC: 0.3 MG/DL (ref 0.1–1)
BLASTS NFR BLD MANUAL: 23 % (ref 0–0)
BLASTS NFR BLD MANUAL: 23 % (ref 0–0)
BUN SERPL-MCNC: 19 MG/DL (ref 8–24)
CALCIUM SERPL-MCNC: 8.3 MG/DL (ref 8.5–10.1)
CHLORIDE SERPL-SCNC: 106 MMOL/L (ref 98–108)
CO2 SERPL-SCNC: 26 MMOL/L (ref 21–32)
CREAT SERPL-MCNC: 1.06 MG/DL (ref 0.6–1.2)
DEPRECATED RDW RBC AUTO: 94.3 FL (ref 35.1–46.3)
EOSINOPHIL # BLD: 0 K/MM3 (ref 0–0.68)
EOSINOPHIL NFR BLD: 0 % (ref 0–6)
ERYTHROCYTE [DISTWIDTH] IN BLOOD BY AUTOMATED COUNT: 27.5 % (ref 11.7–14.2)
GLOBULIN SER CALC-MCNC: 2.9 G/DL (ref 2.2–4)
GLUCOSE SERPL-MCNC: 125 MG/DL (ref 70–99)
HCT VFR BLD AUTO: 30.3 % (ref 37–53)
HGB BLD-MCNC: 9.6 G/DL (ref 13.5–17.5)
LYMPHOCYTES # BLD: 2.19 K/MM3 (ref 0.84–5.2)
LYMPHOCYTES NFR BLD: 9 % (ref 21–46)
MCHC RBC AUTO-ENTMCNC: 31.7 G/DL (ref 31.5–36.5)
MCV RBC AUTO: 96 FL (ref 80–100)
MONOCYTES # BLD: 0 K/MM3 (ref 0.16–1.47)
MONOCYTES NFR BLD: 0 % (ref 4–13)
MYELOCYTES # BLD MANUAL: 0.43 K/MM3 (ref 0–0)
MYELOCYTES NFR BLD MANUAL: 2 % (ref 0–0)
NEUTS BAND NFR BLD MANUAL: 14 % (ref 0–8)
NEUTS SEG # BLD MANUAL: 14.01 K/MM3 (ref 1.96–9.15)
NEUTS SEG NFR BLD MANUAL: 50 % (ref 41–73)
NRBC # BLD AUTO: 0.61 K/MM3 (ref 0–0.02)
NRBC BLD AUTO-RTO: 2.8 /100 WBC (ref 0–0.2)
PLATELET # BLD AUTO: 202 K/MM3 (ref 150–400)
POTASSIUM SERPL-SCNC: 4.6 MMOL/L (ref 3.5–5.5)
PROMYELOCYTES # BLD MANUAL: 0.21 K/MM3 (ref 0–0)
PROMYELOCYTES NFR BLD MANUAL: 1 % (ref 0–0)
PROT SERPL-MCNC: 6.4 G/DL (ref 6.4–8.2)
SODIUM SERPL-SCNC: 136 MMOL/L (ref 136–145)
TOTAL CELLS COUNTED BLD: 100
VARIANT LYMPHS NFR BLD MANUAL: 1 % (ref 0–0)

## 2023-05-23 NOTE — NUR
TRANSITION TO COMFORT CARE/SHIFT SUMMARY
PT ABD PAIN STOPPED TODAY. PAIN MOVED TO HIS R HIP AND THIGH. HIP/THIGH PAIN
HAS BEEN DIFFICUTLT TO TREAT WITH LITTLE TO NO RELIEF FROM PAIN MEDS. DR. 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 AFTER
TALKING WITH DR. CARVAJAL AND PALLIATIVE CARE, ORDER TO START PT ON COMFORT CARE
WAS PLACED AND PLAN TO DC ON HOPSICE TOMORROW. PT SAD BUT STOIC IN HIS AFFECT
SINCE GETTING THIS NEWS. COMFORT CARE INITIATED AND TELE/CONT PULSE OX
REMOVED. 2L O2 REMAINS IN PLACE FOR COMFORT. HUMIDIFICATION ADDED TO O2
TUBING. PT FAMILY IN ROOM AT THIS TIME. CALL LIGHT IN REACH.

## 2023-05-23 NOTE — NUR
RECIEVED PATIENT ALERT AND ORIENTED X4, PLEASANT AND COOPERATIVE. ABD AND R
HIP PAIN TREATED PER MAR, AND KEPT NPO FOR POSSIBLE SURGICAL APPENDECTOMY. 1X
ASSIST WITH CANE TO BATHROOM, NS INFUSING, NO OTHER ISSUES TO REPORT.

## 2023-05-23 NOTE — NUR
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
(Myelodysplastic Syndrome) in 2021. The pt stopped therapy due to episodes of
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
was being treated with. The pt weighed his options, and decided to stop
therapy to keep his sight, understanding it could shorten his life.
  He came to the hospital for uncontrolled abd pain, which became diffuse
pain. Dr. Bolivar consulted today with the patient, and recommends he be placed
on comfort care with a focus on pain management, and d/c home with hospice as
soon as possible, as he could have "days to weeks" left to live.
  Pt lives with his wife, and they report being very active in the community,
and enjoy spending time with children and grandchildren.  After a discussion
on Hospice Care, they elected to go home with hospice. [facility] able to admit
pt tomorrow. POLST filled out, now DNR/Comfort Care.
  Pt getting some relief with IV dilaudid, but he reports using oxycodone
after bilat total knee replacements with great success. Will begin a 25mcg
Fentanyl Patch for long acting coverage, with Oxycodone 5-10mg PO Q4 hrs prn.
He will still have IV dilaudid available while the fentanyl patch is reaching
therapeutic level.

## 2023-05-24 LAB
ALBUMIN SERPL BCP-MCNC: 3 G/DL (ref 3.4–5)
ALBUMIN/GLOB SERPL: 1 {RATIO} (ref 0.8–1.8)
ALT SERPL W P-5'-P-CCNC: 23 U/L (ref 12–78)
ANION GAP SERPL CALCULATED.4IONS-SCNC: 6 MMOL/L (ref 6–16)
AST SERPL W P-5'-P-CCNC: 63 U/L (ref 12–37)
BASOPHILS # BLD: 0 K/MM3 (ref 0–0.23)
BASOPHILS NFR BLD: 0 % (ref 0–2)
BILIRUB SERPL-MCNC: 0.5 MG/DL (ref 0.1–1)
BLASTS NFR BLD MANUAL: 33 % (ref 0–0)
BUN SERPL-MCNC: 13 MG/DL (ref 8–24)
CALCIUM SERPL-MCNC: 8.5 MG/DL (ref 8.5–10.1)
CHLORIDE SERPL-SCNC: 103 MMOL/L (ref 98–108)
CO2 SERPL-SCNC: 26 MMOL/L (ref 21–32)
CREAT SERPL-MCNC: 0.83 MG/DL (ref 0.6–1.2)
DEPRECATED RDW RBC AUTO: 94.4 FL (ref 35.1–46.3)
EOSINOPHIL # BLD: 0 K/MM3 (ref 0–0.68)
EOSINOPHIL NFR BLD: 0 % (ref 0–6)
ERYTHROCYTE [DISTWIDTH] IN BLOOD BY AUTOMATED COUNT: 27 % (ref 11.7–14.2)
GLOBULIN SER CALC-MCNC: 3.1 G/DL (ref 2.2–4)
GLUCOSE SERPL-MCNC: 125 MG/DL (ref 70–99)
HCT VFR BLD AUTO: 28.6 % (ref 37–53)
HGB BLD-MCNC: 8.9 G/DL (ref 13.5–17.5)
LYMPHOCYTES # BLD: 1.3 K/MM3 (ref 0.84–5.2)
LYMPHOCYTES NFR BLD: 7 % (ref 21–46)
MCHC RBC AUTO-ENTMCNC: 31.1 G/DL (ref 31.5–36.5)
MCV RBC AUTO: 97 FL (ref 80–100)
METAMYELOCYTES # BLD MANUAL: 0.37 K/MM3 (ref 0–0)
METAMYELOCYTES NFR BLD MANUAL: 2 % (ref 0–0)
MONOCYTES # BLD: 0 K/MM3 (ref 0.16–1.47)
MONOCYTES NFR BLD: 0 % (ref 4–13)
MYELOCYTES # BLD MANUAL: 0.74 K/MM3 (ref 0–0)
MYELOCYTES NFR BLD MANUAL: 4 % (ref 0–0)
NEUTS BAND NFR BLD MANUAL: 3 % (ref 0–8)
NEUTS SEG # BLD MANUAL: 10.05 K/MM3 (ref 1.96–9.15)
NEUTS SEG NFR BLD MANUAL: 51 % (ref 41–73)
NRBC # BLD AUTO: 0.24 K/MM3 (ref 0–0.02)
NRBC BLD AUTO-RTO: 1.3 /100 WBC (ref 0–0.2)
PLATELET # BLD AUTO: 155 K/MM3 (ref 150–400)
POTASSIUM SERPL-SCNC: 4.1 MMOL/L (ref 3.5–5.5)
PROT SERPL-MCNC: 6.1 G/DL (ref 6.4–8.2)
SODIUM SERPL-SCNC: 135 MMOL/L (ref 136–145)
TOTAL CELLS COUNTED BLD: 100

## 2023-05-24 NOTE — NUR
Pt reports inadequate pain control overnight, so adjustments made to current
treatment plan. Fentanyl patch increased to 50mcg's, oxycodone increased to
10mg every 4 hrs prn, and dilaudid IV dose increased as well.
  Pt will not be discharged until he has adequate pain control, so likely
tomorrow. He does appear to be resting comfortably at this time.
  Daughter is at bedside, and wife is at home preparing for equipment
delivery. Will continue to monitor.

## 2023-05-24 NOTE — NUR
PATIENT VERY PAINFUL THIS AM AND MEDICATIONS ADJUSTED TO ACHEIVE BETTER PAIN
CONTROL. PATIENTS PAIN IS MAINLY IN HIS R HIP.  DILAUDID NEEDED X2 THIS SHIFT
FOR BREAKTHROUGH PAIN. FENTANYL PATCH INCREASED TO 50MCG AND OXYCODONE GIVEN
Q4 HOURS. LIQUID OXYCODONE NOW ADDED FOR BREAKTHROUGH PAIN. DISCUSSED PAIN
MANAGEMENT WITH CHRISTIANE, PALLIATIVE CARE NURSE AND WAS INSTRUCTED TO USE
OXYCODONE PILLS Q4 HOURS AND LIQUID OXYCODONE FOR BREAKTHROUGH PAIN BEFORE
USING THE DILAUDID. THE GOAL IS TO HAVE PATIENTS PAIN CONTROLLED WITH ORAL
PAIN MEDICATIONS IN ORDER TO SEND HIM HOME WITH HOSPICE CARE. PATIENT ON 4LO2
TO MAINTAIN SATS. FALL PRECAUTION IN PLACE. USING URINAL AT BEDSIDE WITH
ASSISTANCE. PATIENT LOST HIS VISION EARLY THIS AM AND VISION HAS NOT RETURNED.
PATIENT DID NOT EAT MUCH AT ALL TODAY. MULTIPLE FRIENDS AND FAMILY IN TO VISIT
TODAY. PATIENT ABLE TO MAKE NEEDS KNOWN AND IS CALM AND COOPERATIVE WITH CARE.

## 2023-05-25 NOTE — NUR
SHIFT SUMMARY
83 YR M ON COMFORT CARE. PAIN APPEARS TO BE MUCH BETTER CONTROLLED THIS
SHIFT. OXY Q4 AND HAS NOT HAD BREAKTHROUGH PAIN THUS FAR. HIS WIFE WAS AT
BEDSIDE UNTIL LATER IN THE EVENING AND PT HAS BEEN SLEEPING SINCE SHE LEFT. HE
WAS WOKEN FOR Q4 PAIN MEDS IN ORDER TO ELIMINATE BREAKTHROUGH PAIN. HE IS
STRUGGLING WITH STANDING TO USE URINAL AND SEEMS TO DO BETTER SITTING AT
BEDSIDE TO USE THE URINAL. HE IS STILL UNABLE TO SEE BUT APPEARS TO BE
ADJUSTING WELL.

## 2023-05-25 NOTE — NUR
Spiritual Care Visit.
Pt. was being prepped for discharge by the staff. This  connected with
the Pts. daughter who is taking him home on hospice. Facilitated a life
review. Daughter displayed evidence of love and support for the Pt. Beatris
verbalized gratitude for the spiritual care visit.

## 2023-05-25 NOTE — NUR
Pt doing much better today; he is going home with hospice. His pain is well
controlled. Spoke to wife Dane, she states the equipment is set up and ready.

## 2023-05-25 NOTE — NUR
PATIENT D/C'D TO HOME WITH HOSPICE. DC INSTRUCTIONS AND EDUCATION DISCUSSED
WITH PATIENT AND COPY PROVIDED. HOSPICE NURSE TO MEET PATIENT AT HIS HOME.
FAMILY AND PATIENT DENY ANY FURTHER QUESTIONS OR CONCERNS.